# Patient Record
Sex: FEMALE | Employment: OTHER | URBAN - METROPOLITAN AREA
[De-identification: names, ages, dates, MRNs, and addresses within clinical notes are randomized per-mention and may not be internally consistent; named-entity substitution may affect disease eponyms.]

---

## 2023-11-02 RX ORDER — ASPIRIN 81 MG/1
81 TABLET ORAL DAILY
COMMUNITY

## 2023-11-02 RX ORDER — METOPROLOL TARTRATE 100 MG/1
100 TABLET ORAL 2 TIMES DAILY
COMMUNITY

## 2023-11-02 RX ORDER — HYDROCHLOROTHIAZIDE 25 MG/1
25 TABLET ORAL DAILY
COMMUNITY

## 2023-11-02 NOTE — TELEPHONE ENCOUNTER
Gina Adhikari is calling to request a refill on the following medication(s):    Last Visit Date (If Applicable):  Visit date not found    Next Visit Date:    02/21/2023    Medication Request:  Requested Prescriptions     Pending Prescriptions Disp Refills    simvastatin (ZOCOR) 40 MG tablet [Pharmacy Med Name: SIMVASTATIN 40 MG TABLET] 90 tablet      Sig: TAKE ONE TABLET BY MOUTH EVERY EVENING

## 2023-11-02 NOTE — TELEPHONE ENCOUNTER
Hetty Lennox is calling to request a refill on the following medication(s):    Last Visit Date (If Applicable):  7/38/8890    Next Visit Date:    2/21/2024    Medication Request:  Requested Prescriptions     Pending Prescriptions Disp Refills    hydroCHLOROthiazide (HYDRODIURIL) 25 MG tablet [Pharmacy Med Name: hydroCHLOROthiazide 25 MG TABLET] 90 tablet      Sig: TAKE ONE TABLET BY MOUTH EVERY MORNING

## 2023-11-03 RX ORDER — HYDROCHLOROTHIAZIDE 25 MG/1
25 TABLET ORAL EVERY MORNING
Qty: 90 TABLET | Refills: 3 | Status: SHIPPED | OUTPATIENT
Start: 2023-11-03

## 2023-11-03 RX ORDER — SIMVASTATIN 40 MG
40 TABLET ORAL
Qty: 90 TABLET | Refills: 3 | Status: SHIPPED | OUTPATIENT
Start: 2023-11-03

## 2023-12-27 LAB
A/G RATIO, EXTERNAL: NORMAL
ALBUMIN, EXTERNAL: 3.8
ALK PHOS, EXTERNAL: 53
ANION GAP, EXTERNAL: NORMAL
BILI DIRECT, EXTERNAL: NORMAL
BILI TOTAL, EXTERNAL: 0.8
BUN, EXTERNAL: 15
BUN/CREATININE RATIO, EXTERNAL: NORMAL
CALCIUM, EXTERNAL: 8.7
CALCIUM, ION, EXTERNAL: NORMAL
CHLORIDE, EXTERNAL: 101
CHOLESTEROL, TOTAL: 137 MG/DL
CHOLESTEROL/HDL RATIO: 3.3
CO2, EXTERNAL: 34
CREATININE, EXTERNAL: 0.55
CREATININE, URINE: 117.4
EGFR IF AFA, EXTERNAL: 130.7
EGFR IF NONAFRICAN AMERICAN: 108
ESTIMATED AVERAGE GLUCOSE: 166
GLOBULIN, EXTERNAL: NORMAL
GLUCOSE SER, EXTERNAL: 136
HBA1C MFR BLD: 7.4 %
HDLC SERPL-MCNC: 42 MG/DL (ref 35–70)
LDL CHOLESTEROL CALCULATED: 75 MG/DL (ref 0–160)
MICROALBUMIN/CREAT 24H UR: 30.45 MG/G{CREAT}
MICROALBUMIN/CREAT UR-RTO: 259.4
NONHDLC SERPL-MCNC: NORMAL MG/DL
POTASSIUM, EXTERNAL: 3.6
PROTEIN TOT, EXTERNAL: 6.2
SGOT (AST), EXTERNAL: 26
SGPT (ALT), EXTERNAL: 20
SODIUM, EXTERNAL: 143
TRIGL SERPL-MCNC: 98 MG/DL
TSH SERPL DL<=0.05 MIU/L-ACNC: 0.09 UIU/ML
VLDLC SERPL CALC-MCNC: 20 MG/DL

## 2024-02-16 ENCOUNTER — OFFICE VISIT (OUTPATIENT)
Age: 74
End: 2024-02-16
Payer: MEDICARE

## 2024-02-16 VITALS
OXYGEN SATURATION: 97 % | HEIGHT: 69 IN | DIASTOLIC BLOOD PRESSURE: 90 MMHG | BODY MASS INDEX: 28.71 KG/M2 | SYSTOLIC BLOOD PRESSURE: 142 MMHG | HEART RATE: 65 BPM | WEIGHT: 193.8 LBS

## 2024-02-16 DIAGNOSIS — E78.2 MIXED HYPERLIPIDEMIA: ICD-10-CM

## 2024-02-16 DIAGNOSIS — E05.90 HYPERTHYROIDISM: Primary | ICD-10-CM

## 2024-02-16 DIAGNOSIS — E87.6 HYPOKALEMIA: ICD-10-CM

## 2024-02-16 DIAGNOSIS — M15.9 GENERALIZED OSTEOARTHRITIS: ICD-10-CM

## 2024-02-16 DIAGNOSIS — I10 ESSENTIAL HYPERTENSION: ICD-10-CM

## 2024-02-16 DIAGNOSIS — E11.9 TYPE 2 DIABETES MELLITUS WITHOUT COMPLICATION, WITHOUT LONG-TERM CURRENT USE OF INSULIN (HCC): ICD-10-CM

## 2024-02-16 PROCEDURE — 1123F ACP DISCUSS/DSCN MKR DOCD: CPT | Performed by: PHYSICIAN ASSISTANT

## 2024-02-16 PROCEDURE — 99214 OFFICE O/P EST MOD 30 MIN: CPT | Performed by: PHYSICIAN ASSISTANT

## 2024-02-16 PROCEDURE — 3077F SYST BP >= 140 MM HG: CPT | Performed by: PHYSICIAN ASSISTANT

## 2024-02-16 PROCEDURE — 3080F DIAST BP >= 90 MM HG: CPT | Performed by: PHYSICIAN ASSISTANT

## 2024-02-16 RX ORDER — HYDROCODONE BITARTRATE AND ACETAMINOPHEN 5; 325 MG/1; MG/1
1 TABLET ORAL 2 TIMES DAILY
COMMUNITY

## 2024-02-16 RX ORDER — VALSARTAN 320 MG/1
320 TABLET ORAL DAILY
COMMUNITY

## 2024-02-16 RX ORDER — GABAPENTIN 300 MG/1
300 CAPSULE ORAL 2 TIMES DAILY
COMMUNITY

## 2024-02-16 NOTE — PROGRESS NOTES
History    Marital status:      Spouse name: None    Number of children: None    Years of education: None    Highest education level: None   Tobacco Use    Smoking status: Never    Smokeless tobacco: Never   Vaping Use    Vaping Use: Never used     Social Determinants of Health     Financial Resource Strain: Low Risk  (2/16/2024)    Overall Financial Resource Strain (CARDIA)     Difficulty of Paying Living Expenses: Not hard at all   Food Insecurity: No Food Insecurity (2/16/2024)    Hunger Vital Sign     Worried About Running Out of Food in the Last Year: Never true     Ran Out of Food in the Last Year: Never true   Transportation Needs: Unknown (2/16/2024)    PRAPARE - Transportation     Lack of Transportation (Non-Medical): No   Housing Stability: Unknown (2/16/2024)    Housing Stability Vital Sign     Unstable Housing in the Last Year: No        Family History   Problem Relation Age of Onset    Abdominal aortic aneurysm Sister 48    Leukemia Brother 50    Breast Cancer Paternal Grandmother 50    Uterine Cancer Paternal Aunt         PHYSICAL EXAM     BP (!) 142/90   Pulse 65   Ht 1.74 m (5' 8.5\")   Wt 87.9 kg (193 lb 12.8 oz)   SpO2 97%   BMI 29.04 kg/m²    Physical Exam  Vitals reviewed.   Constitutional:       General: She is not in acute distress.     Appearance: Normal appearance.   HENT:      Head: Normocephalic and atraumatic.      Right Ear: Tympanic membrane and external ear normal.      Left Ear: Tympanic membrane and external ear normal.      Nose: Nose normal.      Mouth/Throat:      Mouth: Mucous membranes are moist.      Pharynx: No oropharyngeal exudate or posterior oropharyngeal erythema.   Eyes:      Extraocular Movements: Extraocular movements intact.      Conjunctiva/sclera: Conjunctivae normal.      Pupils: Pupils are equal, round, and reactive to light.   Neck:      Vascular: No carotid bruit.   Cardiovascular:      Rate and Rhythm: Normal rate and regular rhythm.      Pulses:

## 2024-02-27 ENCOUNTER — HOSPITAL ENCOUNTER (OUTPATIENT)
Dept: ULTRASOUND IMAGING | Age: 74
Discharge: HOME | End: 2024-02-29
Payer: MEDICARE

## 2024-02-27 ENCOUNTER — HOSPITAL ENCOUNTER (OUTPATIENT)
Dept: NUCLEAR MEDICINE | Age: 74
Discharge: HOME | End: 2024-02-29
Payer: MEDICARE

## 2024-02-27 ENCOUNTER — HOSPITAL ENCOUNTER (OUTPATIENT)
Age: 74
Discharge: HOME | End: 2024-02-27
Payer: MEDICARE

## 2024-02-27 DIAGNOSIS — E05.90 HYPERTHYROIDISM: ICD-10-CM

## 2024-02-27 DIAGNOSIS — E05.90 HYPERTHYROIDISM: Primary | ICD-10-CM

## 2024-02-27 DIAGNOSIS — E87.6 HYPOKALEMIA: ICD-10-CM

## 2024-02-27 DIAGNOSIS — E04.1 THYROID NODULE: ICD-10-CM

## 2024-02-27 LAB
ANION GAP SERPL CALCULATED.3IONS-SCNC: 10 MMOL/L (ref 9–17)
BUN SERPL-MCNC: 16 MG/DL (ref 8–23)
CALCIUM SERPL-MCNC: 9.1 MG/DL (ref 8.6–10.4)
CHLORIDE SERPL-SCNC: 99 MMOL/L (ref 98–107)
CO2 SERPL-SCNC: 33 MMOL/L (ref 20–31)
CREAT SERPL-MCNC: 0.6 MG/DL (ref 0.5–0.9)
GFR SERPL CREATININE-BSD FRML MDRD: >60 ML/MIN/1.73M2
GLUCOSE SERPL-MCNC: 142 MG/DL (ref 70–99)
POTASSIUM SERPL-SCNC: 3.8 MMOL/L (ref 3.7–5.3)
SODIUM SERPL-SCNC: 142 MMOL/L (ref 135–144)
T3FREE SERPL-MCNC: 3.5 PG/ML (ref 2.02–4.43)
T4 FREE SERPL-MCNC: 1.3 NG/DL (ref 0.9–1.7)

## 2024-02-27 PROCEDURE — 84439 ASSAY OF FREE THYROXINE: CPT

## 2024-02-27 PROCEDURE — 84481 FREE ASSAY (FT-3): CPT

## 2024-02-27 PROCEDURE — 78014 THYROID IMAGING W/BLOOD FLOW: CPT

## 2024-02-27 PROCEDURE — 76536 US EXAM OF HEAD AND NECK: CPT

## 2024-02-27 PROCEDURE — A9509 IODINE I-123 SOD IODIDE MIL: HCPCS | Performed by: PHYSICIAN ASSISTANT

## 2024-02-27 PROCEDURE — 3430000000 HC RX DIAGNOSTIC RADIOPHARMACEUTICAL: Performed by: PHYSICIAN ASSISTANT

## 2024-02-27 PROCEDURE — 36415 COLL VENOUS BLD VENIPUNCTURE: CPT

## 2024-02-27 PROCEDURE — 80048 BASIC METABOLIC PNL TOTAL CA: CPT

## 2024-02-27 RX ADMIN — Medication 0.42 MILLICURIE: at 10:35

## 2024-02-28 ENCOUNTER — HOSPITAL ENCOUNTER (OUTPATIENT)
Dept: NUCLEAR MEDICINE | Age: 74
Discharge: HOME | End: 2024-03-01

## 2024-03-04 NOTE — TELEPHONE ENCOUNTER
Johanne Blevins is calling to request a refill on the following medication(s):    Last Visit Date (If Applicable):  Visit date not found    Next Visit Date:    3/18/2024    Medication Request:  Requested Prescriptions     Pending Prescriptions Disp Refills    metoprolol (LOPRESSOR) 100 MG tablet [Pharmacy Med Name: METOPROLOL TARTRATE 100 MG TAB] 180 tablet 3     Sig: TAKE ONE TABLET BY MOUTH TWICE A DAY    metFORMIN (GLUCOPHAGE) 500 MG tablet [Pharmacy Med Name: metFORMIN  MG TABLET] 90 tablet 3     Sig: TAKE ONE TABLET BY MOUTH DAILY WITH SUPPER

## 2024-03-06 RX ORDER — METOPROLOL TARTRATE 100 MG/1
100 TABLET ORAL 2 TIMES DAILY
Qty: 180 TABLET | Refills: 3 | Status: SHIPPED | OUTPATIENT
Start: 2024-03-06

## 2024-03-18 ENCOUNTER — OFFICE VISIT (OUTPATIENT)
Age: 74
End: 2024-03-18
Payer: MEDICARE

## 2024-03-18 VITALS
SYSTOLIC BLOOD PRESSURE: 130 MMHG | BODY MASS INDEX: 29.03 KG/M2 | TEMPERATURE: 97.9 F | OXYGEN SATURATION: 95 % | DIASTOLIC BLOOD PRESSURE: 90 MMHG | HEIGHT: 69 IN | HEART RATE: 61 BPM | WEIGHT: 196 LBS

## 2024-03-18 DIAGNOSIS — R80.9 PROTEINURIA DUE TO TYPE 2 DIABETES MELLITUS (HCC): ICD-10-CM

## 2024-03-18 DIAGNOSIS — I10 ESSENTIAL HYPERTENSION: Primary | ICD-10-CM

## 2024-03-18 DIAGNOSIS — E04.2 NONTOXIC MULTINODULAR GOITER: ICD-10-CM

## 2024-03-18 DIAGNOSIS — E11.29 PROTEINURIA DUE TO TYPE 2 DIABETES MELLITUS (HCC): ICD-10-CM

## 2024-03-18 DIAGNOSIS — E78.2 MIXED HYPERLIPIDEMIA: ICD-10-CM

## 2024-03-18 DIAGNOSIS — M15.9 GENERALIZED OSTEOARTHRITIS: ICD-10-CM

## 2024-03-18 DIAGNOSIS — E11.65 TYPE 2 DIABETES MELLITUS WITH HYPERGLYCEMIA, WITHOUT LONG-TERM CURRENT USE OF INSULIN (HCC): ICD-10-CM

## 2024-03-18 PROCEDURE — 3080F DIAST BP >= 90 MM HG: CPT | Performed by: INTERNAL MEDICINE

## 2024-03-18 PROCEDURE — 3075F SYST BP GE 130 - 139MM HG: CPT | Performed by: INTERNAL MEDICINE

## 2024-03-18 PROCEDURE — 99214 OFFICE O/P EST MOD 30 MIN: CPT | Performed by: INTERNAL MEDICINE

## 2024-03-18 PROCEDURE — 1123F ACP DISCUSS/DSCN MKR DOCD: CPT | Performed by: INTERNAL MEDICINE

## 2024-03-18 ASSESSMENT — ENCOUNTER SYMPTOMS
EYE REDNESS: 0
BLOOD IN STOOL: 0
SINUS PAIN: 0
DIARRHEA: 0
VOMITING: 0
NAUSEA: 0
SORE THROAT: 0
WHEEZING: 0
CONSTIPATION: 0
BACK PAIN: 0
SHORTNESS OF BREATH: 0
COUGH: 0
SINUS PRESSURE: 0
RHINORRHEA: 0
ABDOMINAL PAIN: 0
EYE PAIN: 0

## 2024-03-18 NOTE — PROGRESS NOTES
[facility]     Date of Visit:  3/18/2024  Patient Name: Johanne Blevins   Patient :  1950     CHIEF COMPLAINT:     Johanne Blevins is a 73 y.o. female who presents today for an general visit to be evaluated for the following condition(s):  Chief Complaint   Patient presents with    Follow-up     Hyperthyroidism       HISTORY OF PRESENT ILLNESS      Prior history of multinodular goiter.  Patient remembers seeing Dr. Brunner in the past.  I cannot see those records he was in the early 's per patient.  Recent TSH slightly low with normal free T4 and T3.  Thyroid ultrasound reveals for thyroid nodules.  Largest was 2.2 x 1.2 x 1.1 mm in the mid left thyroid, TR 4 thyroid uptake scan was normal.  No difficulty swallowing. Home  range.  No CP or SOB. No palpitations.     REVIEW OF SYSTEMS     Review of Systems   Constitutional:  Negative for chills, fever and unexpected weight change.   HENT:  Negative for congestion, ear pain, hearing loss, rhinorrhea, sinus pressure, sinus pain, sneezing and sore throat.    Eyes:  Negative for pain, redness and visual disturbance.   Respiratory:  Negative for cough, shortness of breath and wheezing.    Cardiovascular:  Negative for chest pain, palpitations and leg swelling.   Gastrointestinal:  Negative for abdominal pain, blood in stool, constipation, diarrhea, nausea and vomiting.   Endocrine: Negative for cold intolerance and heat intolerance.   Genitourinary:  Negative for difficulty urinating, dysuria, frequency, hematuria and urgency.   Musculoskeletal:  Negative for arthralgias, back pain, gait problem, joint swelling, myalgias and neck pain.   Skin:  Negative for rash and wound.   Allergic/Immunologic: Negative for immunocompromised state.   Neurological:  Negative for dizziness, tremors, seizures, syncope, speech difficulty, weakness, light-headedness, numbness and headaches.   Psychiatric/Behavioral:  Negative for confusion, hallucinations and sleep

## 2024-04-11 ENCOUNTER — TELEPHONE (OUTPATIENT)
Age: 74
End: 2024-04-11

## 2024-04-11 NOTE — TELEPHONE ENCOUNTER
Patient passed away this morning and th [employer] investigator called and would like some medical history on the patient.     Please call Wes coroners investigator  [phone]

## 2024-04-11 NOTE — TELEPHONE ENCOUNTER
Discussed all over medical problems and medications given the corner.  Apparently she was found in her car on the street passed out and was brought to the emergency department pronounced.